# Patient Record
Sex: MALE | Race: BLACK OR AFRICAN AMERICAN | NOT HISPANIC OR LATINO | Employment: OTHER | URBAN - METROPOLITAN AREA
[De-identification: names, ages, dates, MRNs, and addresses within clinical notes are randomized per-mention and may not be internally consistent; named-entity substitution may affect disease eponyms.]

---

## 2021-06-20 PROBLEM — Z30.09 UNWANTED FERTILITY: Status: ACTIVE | Noted: 2021-06-20

## 2021-06-21 NOTE — PROGRESS NOTES
Chief Complaint: Undesired fertility         History of Present Illness:  Joe Osman is a 30 y.o. male presents for counseling regarding vasectomy for permanent sterilization. The procedure was discussed with the patient. Joe Osman is aware that the procedure should be considered irreversible, although at times it can be reversed with success. Risks for the procedure including local effects of selling, bleeding, pain, the possibility for recanalization, and continued fertility is also possible. Formation of a sperm granuloma also is a possibility. We discussed the procedure, preoperative preparation, and postoperative care. We also discussed the importance of continuing to use contraception post vasectomy, since the patient will not be sterile at that point. We stressed the importance of continuing to use contraception until a follow-up semen specimen is done that shows the absence of sperm. That specimen will normally be obtained eight weeks post-surgery. Joe Osman is voluntarily requesting the procedure and understands that if it is successful he will be unable to father children.     No anticoagulation, no previous scrotal surgery, no cardiopulmonary history. Has 2 children, 9 and 7; in [employer]      Alert and oriented x3  No acute distress  Unlabored respirations  RRR  Clear to auscultation bilaterally  Nontender/nondistended  Normal circumcised phallus, bilateral descended testicles without mass.  Bilateral vas deferens palpable  Grossly oriented to person place and time judgment is intact      Assessment and Plan      Consult for sterilization  Elective sterizaltion  Undesired fertility      Plan    Instructions  • The patient will schedule a vasectomy if he desires.   • Handouts were provided, vasectomy  scanned into the EMR for reference.   • Vasectomy is intended to be a permanent form of contraception.   • Vasectomy does not produce immediate sterility.   • Following vasectomy, another  form of contraception is required until vas occlusion is confirmed by post-vasectomy semen analysis (PVSA).   • Even after vas occlusion is confirmed, vasectomy is not 100% reliable in preventing pregnancy.   • The risk of pregnancy after vasectomy is approximately 1 in 2,000 for men who have no sperm in the semen on post-vasectomy semen analysis showing rare non-motile sperm (RNMS).   • Repeat vasectomy is necessary in <1% of vasectomies, provided that a technique for vas occlusion known to have low occlusive failure rate has been used.   • Patient's should refrain from ejaculation for approximately 1 week after vasectomy.   • Options for fertility after vasectomy reversal and sperm retrieval with in vitro fertilization. These options are not always successful, and are very expensive.   • The rates of surgical complications such as symptomatic hematoma and infection are 1-2%  • Chronic scrotal pain associated with negative impact on quality of life occurs after vasectomy in about 1-2% of men. Few of these men require additional surgery.   • Other permanent and non-permanent alternatives to vasectomy are available.  • Patient voiced understanding of the above statements.   • Electronically identified patient education materials provided electronically  • Aware he must have a  present at time of vasectomy  • Valium prescription provided      Patient has decided to schedule a vasectomy.      Shanique Gonsalves MD   6/20/2021   21:29 EDT       MDM low: New problem acute uncomplicated; moderate risk decision regarding minor surgery with identified patient or procedure risk factors

## 2021-06-24 ENCOUNTER — OFFICE VISIT (OUTPATIENT)
Dept: UROLOGY | Facility: CLINIC | Age: 31
End: 2021-06-24

## 2021-06-24 VITALS — RESPIRATION RATE: 14 BRPM | WEIGHT: 238 LBS | BODY MASS INDEX: 30.54 KG/M2 | HEIGHT: 74 IN

## 2021-06-24 DIAGNOSIS — Z30.09 UNWANTED FERTILITY: Primary | ICD-10-CM

## 2021-06-24 PROCEDURE — 99203 OFFICE O/P NEW LOW 30 MIN: CPT | Performed by: UROLOGY

## 2021-06-24 RX ORDER — DIAZEPAM 10 MG/1
10 TABLET ORAL ONCE
Qty: 1 TABLET | Refills: 0 | Status: SHIPPED | OUTPATIENT
Start: 2021-06-24 | End: 2021-06-24

## 2021-06-24 RX ORDER — IBUPROFEN 200 MG
200 TABLET ORAL EVERY 6 HOURS PRN
COMMUNITY

## 2022-07-21 ENCOUNTER — OFFICE VISIT (OUTPATIENT)
Dept: UROLOGY | Facility: CLINIC | Age: 32
End: 2022-07-21

## 2022-07-21 VITALS — WEIGHT: 256 LBS | HEIGHT: 74 IN | BODY MASS INDEX: 32.85 KG/M2 | RESPIRATION RATE: 16 BRPM

## 2022-07-21 DIAGNOSIS — Z30.09 UNWANTED FERTILITY: Primary | ICD-10-CM

## 2022-07-21 PROCEDURE — 99213 OFFICE O/P EST LOW 20 MIN: CPT | Performed by: UROLOGY

## 2022-07-21 RX ORDER — DIAZEPAM 10 MG/1
10 TABLET ORAL ONCE
Qty: 1 TABLET | Refills: 0 | Status: SHIPPED | OUTPATIENT
Start: 2022-07-21 | End: 2022-07-21

## 2022-07-21 NOTE — PROGRESS NOTES
Chief Complaint: Undesired fertility         History of Present Illness:  Joe Osman is a 32 y.o. male seen a year ago for vasectomy consultation.  Never had procedure.  He presents for repeat counseling regarding vasectomy for permanent sterilization. The procedure was discussed with the patient. Joe Osman is aware that the procedure should be considered irreversible, although at times it can be reversed with success. Risks for the procedure including local effects of swelling, bleeding, pain, the possibility for recanalization, and continued fertility is also possible. Formation of a sperm granuloma also is a possibility. We discussed the procedure, preoperative preparation, and postoperative care. We also discussed the importance of continuing to use contraception post vasectomy, since the patient will not be sterile at that point. We stressed the importance of continuing to use contraception until a follow-up semen specimen is done that shows the absence of sperm. That specimen will normally be obtained eight weeks post-surgery. Joe Osman is voluntarily requesting the procedure and understands that if it is successful he will be unable to father children.   Denies significant changes since last visit.    No anticoagulation, no previous scrotal surgery, no cardiopulmonary history    Alert and oriented x3  No acute distress  Unlabored respirations  Regular rate, no chest retractions   Nontender/nondistended  Normal phallus without lesions, bilateral descended testicles without mass.  Bilateral vas deferens palpable  Grossly oriented to person place and time judgment is intact      Assessment and Plan      Consult for sterilization  Elective sterizaltion  Undesired fertility      Plan    Instructions  • The patient will schedule a vasectomy if he desires.   • Handouts were provided, vasectomy  scanned into the EMR for reference.   • Vasectomy is intended to be a permanent form of  contraception.   • Vasectomy does not produce immediate sterility.   • Following vasectomy, another form of contraception is required until vas occlusion is confirmed by post-vasectomy semen analysis (PVSA).   • Even after vas occlusion is confirmed, vasectomy is not 100% reliable in preventing pregnancy.   • The risk of pregnancy after vasectomy is approximately 1 in 2,000 for men who have no sperm in the semen on post-vasectomy semen analysis showing rare non-motile sperm (RNMS).   • Repeat vasectomy is necessary in <1% of vasectomies, provided that a technique for vas occlusion known to have low occlusive failure rate has been used.   • Patient's should refrain from ejaculation for approximately 1 week after vasectomy.   • Options for fertility after vasectomy reversal and sperm retrieval with in vitro fertilization. These options are not always successful, and are very expensive.   • The rates of surgical complications such as symptomatic hematoma and infection are 1-2%  • Chronic scrotal pain associated with negative impact on quality of life occurs after vasectomy in about 1-2% of men. Few of these men require additional surgery.   • Other permanent and non-permanent alternatives to vasectomy are available.  • Patient voiced understanding of the above statements.   • Electronically identified patient education materials provided electronically  • Aware he must have a  present at time of vasectomy  • Valium prescription provided      Patient has decided to schedule a vasectomy.      Shanique Gonsalves MD   7/21/2022   12:40 EDT       MDM low: New problem acute uncomplicated; moderate risk decision regarding minor surgery with identified patient or procedure risk factors

## 2022-08-11 ENCOUNTER — PROCEDURE VISIT (OUTPATIENT)
Dept: UROLOGY | Facility: CLINIC | Age: 32
End: 2022-08-11

## 2022-08-11 VITALS — RESPIRATION RATE: 16 BRPM

## 2022-08-11 DIAGNOSIS — Z30.2 STERILIZATION: Primary | ICD-10-CM

## 2022-08-11 PROCEDURE — 55250 REMOVAL OF SPERM DUCT(S): CPT | Performed by: UROLOGY

## 2022-08-11 RX ORDER — DIAZEPAM 10 MG/1
10 TABLET ORAL AS NEEDED
COMMUNITY

## 2022-08-11 NOTE — PROGRESS NOTES
Preoperative diagnosis  Elective sterilization    Postoperative diagnosis  Elective sterilization    Procedure performed  Bilateral vasectomy    Surgeon  Shanique Gonsalves MD    Anesthesia  10  mL lidocaine 2% local injection    Complications  None    EBL  2 mL    Specimen  Left vas  Right vas    Indications  32 y.o. male agreed to undergo the above named procedure after discussion of the alternatives, risks and benefits.  Informed consent was obtained.      Procedure  The patient was appropriately identified, brought into the procedure room, and placed supine on the procedure table. A time was undertaken documenting the correct patient, site and procedure. His scrotum was prepped and draped in the standard sterile fashion. We first approached the right vas deferens. This was identified,  from the spermatic cord structures, and brought to the anterolateral aspect of the hemiscrotum. The local anesthetic solution was injected and once an adequate level of local anesthesia was achieved, a scalpel was used to make a 1 cm incision in the skin overlying the vas deferens approximately mid scrotum. A sharp hemostat was used to dissect the level of the vas deferens and on both of its sides, allowing for ring forceps to be introduced and grasp the vas deferens and externalize it through the skin incision. We then used a combination of sharp and blunt dissection to release the exposed vasal segment from all surrounding tissues. An approximately 1 cm piece of vas deferens was then sharply excised and removed. One blade of a sharp hemostat was used to probe each end of the severed vas deferens, confirming the presence of a vasal lumen. Electocautery was then used to fulgurate both cut surfaces of the severed vas deferens. The abdominal side of the vas deferens was then placed underneath some perivasal tissues that were closed above it, using a figure-of-eight 3-0 chromic stitch, thus placing the two edges of the  severed vas deferens in different tissue planes. Hemostasis was confirmed and the severed vas deferens was allowed to drop back into the scrotum. We then turned our attention to the left vas deferens. This was also identified and brought to through the incision. The local anesthetic was then delivered on this side. An identical procedure was then carried out on the vas deferens.  The wound was dressed with bacitracin ointment, folded 4x4 gauze, and bulky fluffs. The patient tolerated the procedure well and there were no intraoperative or immediate postoperative complications.       Assessment   Sterilization     V25.2    Plan   OrdersVasectomy (26627) - V25.2 - 06/03/2021   Instructions for Wound Care discussed.   Patient reminded to continue another method for contraception, until he has had a post-vasectomy semen analysis that is negative for sperm.. Patient voiced understanding.   First post-vas semen analysis in 10 weeks; patient to drop off specimen in lab.  Extensive instructions provided.  Patient encouraged to follow-up in office if needed.  All questions addressed

## 2022-10-24 ENCOUNTER — TELEPHONE (OUTPATIENT)
Dept: UROLOGY | Facility: CLINIC | Age: 32
End: 2022-10-24

## 2022-10-24 NOTE — TELEPHONE ENCOUNTER
lmom for pt to return our call.  Pt has an order for semen analysis check.  Pt has not had done yet.  Order is in pts chart.

## 2022-12-29 ENCOUNTER — TELEPHONE (OUTPATIENT)
Dept: UROLOGY | Facility: CLINIC | Age: 32
End: 2022-12-29

## 2022-12-29 LAB
SPECIMEN VOL SMN: 2 ML (ref 1.5–5)
SPERM - POST VASECTOMY: NORMAL

## 2022-12-29 PROCEDURE — 89320 SEMEN ANAL VOL/COUNT/MOT: CPT | Performed by: UROLOGY

## 2022-12-29 NOTE — TELEPHONE ENCOUNTER
----- Message from Shanique Gonsalves MD sent at 12/29/2022  3:37 PM EST -----  Please notify patient that there is no sperm seen in specimen.  He is now considered sterile.  He is okay to stop using birth control.  Thanks